# Patient Record
Sex: MALE | Race: WHITE | Employment: OTHER | URBAN - METROPOLITAN AREA
[De-identification: names, ages, dates, MRNs, and addresses within clinical notes are randomized per-mention and may not be internally consistent; named-entity substitution may affect disease eponyms.]

---

## 2019-05-06 ENCOUNTER — OFFICE VISIT (OUTPATIENT)
Dept: URGENT CARE | Facility: URGENT CARE | Age: 54
End: 2019-05-06
Payer: COMMERCIAL

## 2019-05-06 VITALS
OXYGEN SATURATION: 97 % | SYSTOLIC BLOOD PRESSURE: 173 MMHG | DIASTOLIC BLOOD PRESSURE: 117 MMHG | HEART RATE: 112 BPM | TEMPERATURE: 100.7 F | WEIGHT: 229 LBS

## 2019-05-06 DIAGNOSIS — R07.0 THROAT PAIN: ICD-10-CM

## 2019-05-06 DIAGNOSIS — R03.0 ELEVATED BP WITHOUT DIAGNOSIS OF HYPERTENSION: ICD-10-CM

## 2019-05-06 DIAGNOSIS — J02.0 STREPTOCOCCAL SORE THROAT: Primary | ICD-10-CM

## 2019-05-06 LAB
DEPRECATED S PYO AG THROAT QL EIA: ABNORMAL
SPECIMEN SOURCE: ABNORMAL

## 2019-05-06 PROCEDURE — 87880 STREP A ASSAY W/OPTIC: CPT | Performed by: INTERNAL MEDICINE

## 2019-05-06 PROCEDURE — 99203 OFFICE O/P NEW LOW 30 MIN: CPT | Performed by: INTERNAL MEDICINE

## 2019-05-06 RX ORDER — AMOXICILLIN 500 MG/1
500 CAPSULE ORAL 2 TIMES DAILY
Qty: 20 CAPSULE | Refills: 0 | Status: SHIPPED | OUTPATIENT
Start: 2019-05-06 | End: 2019-05-16

## 2019-05-06 ASSESSMENT — ENCOUNTER SYMPTOMS
COUGH: 0
HEADACHES: 0
RHINORRHEA: 0

## 2019-05-06 NOTE — PATIENT INSTRUCTIONS
Amoxicillin 2 x day 10 days      Thee to follow up with Primary Care provider regarding elevated blood pressure.   Low salt.    Monitor & recheck blood pressure 1 month.    Call or return to clinic if symptoms worsen or fail to improve as anticipated.

## 2019-05-06 NOTE — PROGRESS NOTES
SUBJECTIVE:   Thee Mckeon is a 54 year old male presenting with a chief complaint of   Chief Complaint   Patient presents with     Urgent Care     Pt in [facility] to have eval for sore throat and fever.     Pharyngitis       He is a new patient of [facility].    URI Adult    Onset of symptoms was today  Course of illness is worsening.      Current and Associated symptoms: sore throat and feeling flush like getting fever  Treatment measures tried include None tried.  Predisposing factors include ill contact: Work and exposure to strep.    No caffeine    Review of Systems   HENT: Negative for rhinorrhea.    Respiratory: Negative for cough.    Musculoskeletal:        Stiff body   Neurological: Negative for headaches.       Past Medical History:   Diagnosis Date     Allergy to dust      Family History   Problem Relation Age of Onset     Migraines Mother      Hypertension No family hx of      Current Outpatient Medications   Medication Sig Dispense Refill     amoxicillin (AMOXIL) 500 MG capsule Take 1 capsule (500 mg) by mouth 2 times daily for 10 days 20 capsule 0     Social History     Tobacco Use     Smoking status: Never Smoker     Smokeless tobacco: Never Used   Substance Use Topics     Alcohol use: Not on file       OBJECTIVE  BP (!) 150/100   Pulse 103   Temp 100.7  F (38.2  C) (Oral)   Wt 103.9 kg (229 lb)   SpO2 97%     Physical Exam   Constitutional: He appears well-developed and well-nourished.   HENT:   Mouth/Throat: No oropharyngeal exudate.   Tm nl b   Cardiovascular: Normal rate, regular rhythm and normal heart sounds.   Pulmonary/Chest: Effort normal and breath sounds normal.   Lymphadenopathy:     He has no cervical adenopathy.   Vitals reviewed.      Labs:  Results for orders placed or performed in visit on 05/06/19 (from the past 24 hour(s))   Strep, Rapid Screen   Result Value Ref Range    Specimen Description Throat     Rapid Strep A Screen (A)      POSITIVE: Group A Streptococcal antigen detected  by immunoassay.           ASSESSMENT:      ICD-10-CM    1. Streptococcal sore throat J02.0 amoxicillin (AMOXIL) 500 MG capsule   2. Throat pain R07.0 Strep, Rapid Screen   3. Elevated BP without diagnosis of hypertension R03.0 Nursing Communication 1      150/100  Recheck bp 173/117  Recommend purchase Arm cuff.     Possible white coat hypertension     Exercises on regular basis    Patient Instructions   Amoxicillin 2 x day 10 days      Thee to follow up with Primary Care provider regarding elevated blood pressure.   Low salt.    Monitor & recheck blood pressure 1 month.    Call or return to clinic if symptoms worsen or fail to improve as anticipated.